# Patient Record
Sex: FEMALE | Race: WHITE | NOT HISPANIC OR LATINO | URBAN - METROPOLITAN AREA
[De-identification: names, ages, dates, MRNs, and addresses within clinical notes are randomized per-mention and may not be internally consistent; named-entity substitution may affect disease eponyms.]

---

## 2017-02-13 ENCOUNTER — COMMUNICATION - HEALTHEAST (OUTPATIENT)
Dept: FAMILY MEDICINE | Facility: CLINIC | Age: 57
End: 2017-02-13

## 2017-02-13 DIAGNOSIS — E03.9 HYPOTHYROID: ICD-10-CM

## 2017-02-18 ENCOUNTER — COMMUNICATION - HEALTHEAST (OUTPATIENT)
Dept: FAMILY MEDICINE | Facility: CLINIC | Age: 57
End: 2017-02-18

## 2017-02-27 ENCOUNTER — OFFICE VISIT - HEALTHEAST (OUTPATIENT)
Dept: FAMILY MEDICINE | Facility: CLINIC | Age: 57
End: 2017-02-27

## 2017-02-27 DIAGNOSIS — F41.1 GAD (GENERALIZED ANXIETY DISORDER): ICD-10-CM

## 2017-02-27 ASSESSMENT — MIFFLIN-ST. JEOR: SCORE: 1101.59

## 2017-03-16 ENCOUNTER — COMMUNICATION - HEALTHEAST (OUTPATIENT)
Dept: FAMILY MEDICINE | Facility: CLINIC | Age: 57
End: 2017-03-16

## 2017-05-01 ENCOUNTER — AMBULATORY - HEALTHEAST (OUTPATIENT)
Dept: LAB | Facility: CLINIC | Age: 57
End: 2017-05-01

## 2017-05-01 DIAGNOSIS — E56.9 VITAMIN DEFICIENCY: ICD-10-CM

## 2017-05-01 DIAGNOSIS — E78.5 HYPERLIPIDEMIA: ICD-10-CM

## 2017-05-01 LAB
CHOLEST SERPL-MCNC: 193 MG/DL
FASTING STATUS PATIENT QL REPORTED: YES
HDLC SERPL-MCNC: 55 MG/DL
LDLC SERPL CALC-MCNC: 123 MG/DL
TRIGL SERPL-MCNC: 76 MG/DL

## 2017-06-22 ENCOUNTER — OFFICE VISIT - HEALTHEAST (OUTPATIENT)
Dept: FAMILY MEDICINE | Facility: CLINIC | Age: 57
End: 2017-06-22

## 2017-06-22 ENCOUNTER — COMMUNICATION - HEALTHEAST (OUTPATIENT)
Dept: SCHEDULING | Facility: CLINIC | Age: 57
End: 2017-06-22

## 2017-06-22 DIAGNOSIS — S16.1XXA NECK STRAIN, INITIAL ENCOUNTER: ICD-10-CM

## 2017-06-22 ASSESSMENT — MIFFLIN-ST. JEOR: SCORE: 1101.59

## 2017-07-13 ENCOUNTER — COMMUNICATION - HEALTHEAST (OUTPATIENT)
Dept: FAMILY MEDICINE | Facility: CLINIC | Age: 57
End: 2017-07-13

## 2017-07-13 RX ORDER — MESALAMINE 1000 MG/1
SUPPOSITORY RECTAL
Qty: 30 SUPPOSITORY | Refills: 3 | Status: SHIPPED | OUTPATIENT
Start: 2017-07-13

## 2017-08-07 ENCOUNTER — RECORDS - HEALTHEAST (OUTPATIENT)
Dept: ADMINISTRATIVE | Facility: OTHER | Age: 57
End: 2017-08-07

## 2017-08-16 ENCOUNTER — COMMUNICATION - HEALTHEAST (OUTPATIENT)
Dept: FAMILY MEDICINE | Facility: CLINIC | Age: 57
End: 2017-08-16

## 2017-08-16 DIAGNOSIS — F41.1 GAD (GENERALIZED ANXIETY DISORDER): ICD-10-CM

## 2017-09-11 ENCOUNTER — RECORDS - HEALTHEAST (OUTPATIENT)
Dept: ADMINISTRATIVE | Facility: OTHER | Age: 57
End: 2017-09-11

## 2017-11-15 ENCOUNTER — RECORDS - HEALTHEAST (OUTPATIENT)
Dept: ADMINISTRATIVE | Facility: OTHER | Age: 57
End: 2017-11-15

## 2017-11-22 ENCOUNTER — OFFICE VISIT - HEALTHEAST (OUTPATIENT)
Dept: FAMILY MEDICINE | Facility: CLINIC | Age: 57
End: 2017-11-22

## 2017-11-22 ENCOUNTER — COMMUNICATION - HEALTHEAST (OUTPATIENT)
Dept: FAMILY MEDICINE | Facility: CLINIC | Age: 57
End: 2017-11-22

## 2017-11-22 DIAGNOSIS — Z12.31 VISIT FOR SCREENING MAMMOGRAM: ICD-10-CM

## 2017-11-22 DIAGNOSIS — Z00.00 ROUTINE GENERAL MEDICAL EXAMINATION AT A HEALTH CARE FACILITY: ICD-10-CM

## 2017-11-22 LAB
CHOLEST SERPL-MCNC: 236 MG/DL
FASTING STATUS PATIENT QL REPORTED: YES
HDLC SERPL-MCNC: 59 MG/DL
LDLC SERPL CALC-MCNC: 158 MG/DL
TRIGL SERPL-MCNC: 97 MG/DL

## 2017-11-22 ASSESSMENT — MIFFLIN-ST. JEOR: SCORE: 1092.52

## 2017-11-27 ENCOUNTER — AMBULATORY - HEALTHEAST (OUTPATIENT)
Dept: FAMILY MEDICINE | Facility: CLINIC | Age: 57
End: 2017-11-27

## 2017-12-21 ENCOUNTER — RECORDS - HEALTHEAST (OUTPATIENT)
Dept: ADMINISTRATIVE | Facility: OTHER | Age: 57
End: 2017-12-21

## 2018-01-18 ENCOUNTER — COMMUNICATION - HEALTHEAST (OUTPATIENT)
Dept: FAMILY MEDICINE | Facility: CLINIC | Age: 58
End: 2018-01-18

## 2018-01-18 DIAGNOSIS — F41.1 GAD (GENERALIZED ANXIETY DISORDER): ICD-10-CM

## 2018-02-20 ENCOUNTER — COMMUNICATION - HEALTHEAST (OUTPATIENT)
Dept: FAMILY MEDICINE | Facility: CLINIC | Age: 58
End: 2018-02-20

## 2018-02-20 DIAGNOSIS — E03.9 HYPOTHYROID: ICD-10-CM

## 2018-05-22 ENCOUNTER — COMMUNICATION - HEALTHEAST (OUTPATIENT)
Dept: LAB | Facility: CLINIC | Age: 58
End: 2018-05-22

## 2018-05-22 ENCOUNTER — AMBULATORY - HEALTHEAST (OUTPATIENT)
Dept: FAMILY MEDICINE | Facility: CLINIC | Age: 58
End: 2018-05-22

## 2018-05-22 DIAGNOSIS — E78.5 HYPERLIPIDEMIA: ICD-10-CM

## 2018-05-23 ENCOUNTER — AMBULATORY - HEALTHEAST (OUTPATIENT)
Dept: LAB | Facility: CLINIC | Age: 58
End: 2018-05-23

## 2018-05-23 DIAGNOSIS — E78.5 HYPERLIPIDEMIA: ICD-10-CM

## 2018-05-23 LAB
CHOLEST SERPL-MCNC: 200 MG/DL
FASTING STATUS PATIENT QL REPORTED: YES
HDLC SERPL-MCNC: 60 MG/DL
LDLC SERPL CALC-MCNC: 130 MG/DL
TRIGL SERPL-MCNC: 49 MG/DL

## 2018-08-21 ENCOUNTER — COMMUNICATION - HEALTHEAST (OUTPATIENT)
Dept: FAMILY MEDICINE | Facility: CLINIC | Age: 58
End: 2018-08-21

## 2018-08-21 DIAGNOSIS — E03.9 HYPOTHYROID: ICD-10-CM

## 2018-08-21 DIAGNOSIS — F41.1 GAD (GENERALIZED ANXIETY DISORDER): ICD-10-CM

## 2018-10-29 ENCOUNTER — COMMUNICATION - HEALTHEAST (OUTPATIENT)
Dept: FAMILY MEDICINE | Facility: CLINIC | Age: 58
End: 2018-10-29

## 2018-10-29 ENCOUNTER — OFFICE VISIT - HEALTHEAST (OUTPATIENT)
Dept: FAMILY MEDICINE | Facility: CLINIC | Age: 58
End: 2018-10-29

## 2018-10-29 DIAGNOSIS — Z00.00 ROUTINE GENERAL MEDICAL EXAMINATION AT A HEALTH CARE FACILITY: ICD-10-CM

## 2018-10-29 DIAGNOSIS — Z23 NEED FOR VACCINATION: ICD-10-CM

## 2018-10-29 DIAGNOSIS — E78.5 HYPERLIPIDEMIA: ICD-10-CM

## 2018-10-29 DIAGNOSIS — E03.9 HYPOTHYROID: ICD-10-CM

## 2018-10-29 LAB
ANION GAP SERPL CALCULATED.3IONS-SCNC: 12 MMOL/L (ref 5–18)
BUN SERPL-MCNC: 7 MG/DL (ref 8–22)
CALCIUM SERPL-MCNC: 10 MG/DL (ref 8.5–10.5)
CHLORIDE BLD-SCNC: 102 MMOL/L (ref 98–107)
CHOLEST SERPL-MCNC: 222 MG/DL
CO2 SERPL-SCNC: 23 MMOL/L (ref 22–31)
CREAT SERPL-MCNC: 0.7 MG/DL (ref 0.6–1.1)
FASTING STATUS PATIENT QL REPORTED: YES
GFR SERPL CREATININE-BSD FRML MDRD: >60 ML/MIN/1.73M2
GLUCOSE BLD-MCNC: 101 MG/DL (ref 70–125)
HDLC SERPL-MCNC: 72 MG/DL
LDLC SERPL CALC-MCNC: 137 MG/DL
POTASSIUM BLD-SCNC: 4.4 MMOL/L (ref 3.5–5)
SODIUM SERPL-SCNC: 137 MMOL/L (ref 136–145)
TRIGL SERPL-MCNC: 63 MG/DL
TSH SERPL DL<=0.005 MIU/L-ACNC: 1.6 UIU/ML (ref 0.3–5)

## 2018-10-29 ASSESSMENT — MIFFLIN-ST. JEOR: SCORE: 1076.92

## 2018-11-21 ENCOUNTER — RECORDS - HEALTHEAST (OUTPATIENT)
Dept: ADMINISTRATIVE | Facility: OTHER | Age: 58
End: 2018-11-21

## 2019-09-07 ENCOUNTER — COMMUNICATION - HEALTHEAST (OUTPATIENT)
Dept: FAMILY MEDICINE | Facility: CLINIC | Age: 59
End: 2019-09-07

## 2019-09-07 DIAGNOSIS — F41.1 GAD (GENERALIZED ANXIETY DISORDER): ICD-10-CM

## 2019-09-07 DIAGNOSIS — E03.9 HYPOTHYROID: ICD-10-CM

## 2019-11-22 ENCOUNTER — RECORDS - HEALTHEAST (OUTPATIENT)
Dept: ADMINISTRATIVE | Facility: OTHER | Age: 59
End: 2019-11-22

## 2019-11-25 ENCOUNTER — OFFICE VISIT - HEALTHEAST (OUTPATIENT)
Dept: FAMILY MEDICINE | Facility: CLINIC | Age: 59
End: 2019-11-25

## 2019-11-25 DIAGNOSIS — Z00.00 ROUTINE GENERAL MEDICAL EXAMINATION AT A HEALTH CARE FACILITY: ICD-10-CM

## 2019-11-25 DIAGNOSIS — K62.89 PROCTITIS: ICD-10-CM

## 2019-11-25 DIAGNOSIS — Z23 FLU VACCINE NEED: ICD-10-CM

## 2019-11-25 DIAGNOSIS — Z12.4 PAP SMEAR FOR CERVICAL CANCER SCREENING: ICD-10-CM

## 2019-11-25 ASSESSMENT — MIFFLIN-ST. JEOR: SCORE: 1115.2

## 2019-11-26 LAB
HPV SOURCE: NORMAL
HUMAN PAPILLOMA VIRUS 16 DNA: NEGATIVE
HUMAN PAPILLOMA VIRUS 18 DNA: NEGATIVE
HUMAN PAPILLOMA VIRUS FINAL DIAGNOSIS: NORMAL
HUMAN PAPILLOMA VIRUS OTHER HR: NEGATIVE
SPECIMEN DESCRIPTION: NORMAL

## 2019-12-04 LAB
BKR LAB AP ABNORMAL BLEEDING: NO
BKR LAB AP BIRTH CONTROL/HORMONES: NORMAL
BKR LAB AP CERVICAL APPEARANCE: NORMAL
BKR LAB AP GYN ADEQUACY: NORMAL
BKR LAB AP GYN INTERPRETATION: NORMAL
BKR LAB AP HPV REFLEX: NORMAL
BKR LAB AP LMP: NORMAL
BKR LAB AP PATIENT STATUS: NORMAL
BKR LAB AP PREVIOUS ABNORMAL: NORMAL
BKR LAB AP PREVIOUS NORMAL: 2016
HIGH RISK?: NO
PATH REPORT.COMMENTS IMP SPEC: NORMAL
RESULT FLAG (HE HISTORICAL CONVERSION): NORMAL

## 2019-12-06 ENCOUNTER — COMMUNICATION - HEALTHEAST (OUTPATIENT)
Dept: FAMILY MEDICINE | Facility: CLINIC | Age: 59
End: 2019-12-06

## 2019-12-06 DIAGNOSIS — F41.1 GAD (GENERALIZED ANXIETY DISORDER): ICD-10-CM

## 2019-12-06 DIAGNOSIS — E03.9 HYPOTHYROID: ICD-10-CM

## 2019-12-07 RX ORDER — FLUOXETINE 10 MG/1
CAPSULE ORAL
Qty: 90 CAPSULE | Refills: 3 | Status: SHIPPED | OUTPATIENT
Start: 2019-12-07

## 2019-12-09 RX ORDER — LEVOTHYROXINE SODIUM 50 UG/1
TABLET ORAL
Qty: 90 TABLET | Refills: 3 | Status: SHIPPED | OUTPATIENT
Start: 2019-12-09

## 2020-01-02 ENCOUNTER — RECORDS - HEALTHEAST (OUTPATIENT)
Dept: ADMINISTRATIVE | Facility: OTHER | Age: 60
End: 2020-01-02

## 2020-10-28 ENCOUNTER — RECORDS - HEALTHEAST (OUTPATIENT)
Dept: ADMINISTRATIVE | Facility: OTHER | Age: 60
End: 2020-10-28

## 2021-03-23 ENCOUNTER — RECORDS - HEALTHEAST (OUTPATIENT)
Dept: ADMINISTRATIVE | Facility: OTHER | Age: 61
End: 2021-03-23

## 2021-05-30 VITALS — WEIGHT: 117 LBS | HEIGHT: 65 IN | BODY MASS INDEX: 19.49 KG/M2

## 2021-05-31 ENCOUNTER — RECORDS - HEALTHEAST (OUTPATIENT)
Dept: ADMINISTRATIVE | Facility: CLINIC | Age: 61
End: 2021-05-31

## 2021-05-31 VITALS — HEIGHT: 65 IN | WEIGHT: 117 LBS | BODY MASS INDEX: 19.49 KG/M2

## 2021-05-31 VITALS — WEIGHT: 115 LBS | HEIGHT: 65 IN | BODY MASS INDEX: 19.16 KG/M2

## 2021-06-01 NOTE — TELEPHONE ENCOUNTER
PCP to review    Refill Approved x 2    Rx renewed per Medication Renewal Policy. Medication was last renewed on 8/22/18.    Maritza Huggins, [facility] Triage/Med Refill 9/8/2019     Requested Prescriptions   Pending Prescriptions Disp Refills     FLUoxetine (PROZAC) 10 MG capsule [Pharmacy Med Name: FLUOXETINE 10MG CAPSULES] 90 capsule 0     Sig: TAKE 1 CAPSULE BY MOUTH ONCE DAILY       SSRI Refill Protocol  Passed - 9/7/2019  5:10 PM        Passed - PCP or prescribing provider visit in last year     Last office visit with prescriber/PCP: 10/29/2018 Dorothy Brock MD OR same dept: 10/29/2018 Dorothy Brock MD OR same specialty: 10/29/2018 Dorothy Brock MD  Last physical: 11/22/2017 Last MTM visit: Visit date not found   Next visit within 3 mo: Visit date not found  Next physical within 3 mo: Visit date not found  Prescriber OR PCP: Dorothy Brcok MD  Last diagnosis associated with med order: 1. MITCH (generalized anxiety disorder)  - FLUoxetine (PROZAC) 10 MG capsule [Pharmacy Med Name: FLUOXETINE 10MG CAPSULES]; TAKE 1 CAPSULE BY MOUTH ONCE DAILY  Dispense: 90 capsule; Refill: 0    2. Hypothyroid  - levothyroxine (SYNTHROID, LEVOTHROID) 50 MCG tablet [Pharmacy Med Name: LEVOTHYROXINE 0.05MG (50MCG) TAB]; TAKE 1 TABLET(50 MCG TOTAL) BY MOUTH DAILY AT 6:00 AM.  Dispense: 90 tablet; Refill: 0    If protocol passes may refill for 12 months if within 3 months of last provider visit (or a total of 15 months).             levothyroxine (SYNTHROID, LEVOTHROID) 50 MCG tablet [Pharmacy Med Name: LEVOTHYROXINE 0.05MG (50MCG) TAB] 90 tablet 0     Sig: TAKE 1 TABLET(50 MCG TOTAL) BY MOUTH DAILY AT 6:00 AM.       Thyroid Hormones Protocol Passed - 9/7/2019  5:10 PM        Passed - Provider visit in past 12 months or next 3 months     Last office visit with prescriber/PCP: 10/29/2018 Dorothy Brock MD OR same dept: 10/29/2018 Dorothy Brock MD OR same specialty: 10/29/2018 Dorothy Brock MD  Last  physical: 11/22/2017 Last MTM visit: Visit date not found   Next visit within 3 mo: Visit date not found  Next physical within 3 mo: Visit date not found  Prescriber OR PCP: Dorothy Brock MD  Last diagnosis associated with med order: 1. MITCH (generalized anxiety disorder)  - FLUoxetine (PROZAC) 10 MG capsule [Pharmacy Med Name: FLUOXETINE 10MG CAPSULES]; TAKE 1 CAPSULE BY MOUTH ONCE DAILY  Dispense: 90 capsule; Refill: 0    2. Hypothyroid  - levothyroxine (SYNTHROID, LEVOTHROID) 50 MCG tablet [Pharmacy Med Name: LEVOTHYROXINE 0.05MG (50MCG) TAB]; TAKE 1 TABLET(50 MCG TOTAL) BY MOUTH DAILY AT 6:00 AM.  Dispense: 90 tablet; Refill: 0    If protocol passes may refill for 12 months if within 3 months of last provider visit (or a total of 15 months).             Passed - TSH on file in past 12 months for patient age 12 & older     TSH   Date Value Ref Range Status   10/29/2018 1.60 0.30 - 5.00 uIU/mL Final

## 2021-06-02 VITALS — WEIGHT: 111.56 LBS | HEIGHT: 65 IN | BODY MASS INDEX: 18.59 KG/M2

## 2021-06-03 VITALS
HEART RATE: 76 BPM | TEMPERATURE: 98 F | BODY MASS INDEX: 19.99 KG/M2 | SYSTOLIC BLOOD PRESSURE: 100 MMHG | WEIGHT: 120 LBS | DIASTOLIC BLOOD PRESSURE: 60 MMHG | HEIGHT: 65 IN

## 2021-06-03 NOTE — PROGRESS NOTES
Chloe Pereira is a 59 y.o. female is here for a  Health Maintenance exam. Patient is overall doing well.  Patient travels for work.  She has no concerns today.  Has been seen at [facility] and diagnosed with a rectal proctitis which is very small but does require treatment.    Healthy Habits:   Regular Exercise: Yes  Sunscreen Use: Yes  Healthy Diet: Yes  Dental Visits Regularly: Yes  Seat Belt: Yes  Sexually active: Yes  Self Breast Exam Monthly:Yes  Hemoccults: No  Flex Sig: No  Colonoscopy: Yes  Lipid Profile: Yes  Glucose Screen: Yes  Prevention of Osteoporosis: Yes  Last Dexa: No  Guns at Home:  No  Domestic Violence:  No    Current Outpatient Medications Include:    Current Outpatient Medications:      FLUoxetine (PROZAC) 10 MG capsule, TAKE 1 CAPSULE BY MOUTH ONCE DAILY, Disp: 90 capsule, Rfl: 0     levothyroxine (SYNTHROID, LEVOTHROID) 50 MCG tablet, TAKE 1 TABLET(50 MCG TOTAL) BY MOUTH DAILY AT 6:00 AM., Disp: 90 tablet, Rfl: 0     mesalamine (CANASA) 1000 MG suppository, INSERT 1 SUPPOSITORY BY RECTAL ROUTE EVERY DAY AT BEDTIME, Disp: 30 suppository, Rfl: 3     cholecalciferol, vitamin D3, 4,000 unit cap, Take 1 capsule by mouth daily., Disp: , Rfl: 0     LORazepam (ATIVAN) 0.5 MG tablet, TAKE 1 TABLET (0.5 MG) BY MOUTH EVERY 6 HOURS AS NEEDED FOR ANXIETY., Disp: 10 tablet, Rfl: 0    Allergies:  No Known Allergies    No past medical history on file.    No past surgical history on file.    OB History    Para Term  AB Living   3 3 3     6   SAB TAB Ectopic Multiple Live Births           3      # Outcome Date GA Lbr Anuel/2nd Weight Sex Delivery Anes PTL Lv   3 Term 97    M Vag-Spont  N GABRIELLA   2 Term 90    F Vag-Spont  N GABRIELLA   1 Term 03/10/88    M Vag-Spont  N GABRIELLA       Immunization History   Administered Date(s) Administered     INFLUENZA,RECOMBINANT,INJ,PF QUADRIVALENT 18+YRS 10/29/2018     Influenza, inj, historic,unspecified 10/27/2016     Influenza,seasonal quad, PF, =/>  6months 10/11/2014, 10/15/2015, 10/19/2016, 11/20/2017     MMR 03/04/2015     PPD Test 02/11/2010, 03/04/2015, 03/03/2017     Tdap 08/11/2011       Family History   Problem Relation Age of Onset     Skin cancer Mother         Squamous  A&W at 80 on 2017     Hypothyroidism Mother      Skin cancer Father         Squamous     Hypothyroidism Father      Atrial fibrillation Father      Hypothyroidism Sister      No Medical Problems Brother      No Medical Problems Sister        Social History     Socioeconomic History     Marital status:      Spouse name: Iglesia     Number of children: 3     Years of education: Not on file     Highest education level: Not on file   Occupational History     Occupation: RN     Comment: Travels, 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   Social Needs     Financial resource strain: Not on file     Food insecurity:     Worry: Not on file     Inability: Not on file     Transportation needs:     Medical: Not on file     Non-medical: Not on file   Tobacco Use     Smoking status: Former Smoker     Smokeless tobacco: Never Used   Substance and Sexual Activity     Alcohol use: Yes     Drug use: No     Sexual activity: Yes   Lifestyle     Physical activity:     Days per week: Not on file     Minutes per session: Not on file     Stress: Not on file   Relationships     Social connections:     Talks on phone: Not on file     Gets together: Not on file     Attends [employer] service: Not on file     Active member of club or organization: Not on file     Attends meetings of clubs or organizations: Not on file     Relationship status: Not on file     Intimate partner violence:     Fear of current or ex partner: Not on file     Emotionally abused: Not on file     Physically abused: Not on file     Forced sexual activity: Not on file   Other Topics Concern     Not on file   Social History Narrative     Not on file       Last cholesterol:   Lab Results   Component Value Date    CHOL 222 (H) 10/29/2018    CHOL 200 (H) 05/23/2018     CHOL 236 (H) 11/22/2017     Lab Results   Component Value Date    HDL 72 10/29/2018    HDL 60 05/23/2018    HDL 59 11/22/2017     Lab Results   Component Value Date    LDLCALC 137 (H) 10/29/2018    LDLCALC 130 (H) 05/23/2018    LDLCALC 158 (H) 11/22/2017     Lab Results   Component Value Date    TRIG 63 10/29/2018    TRIG 49 05/23/2018    TRIG 97 11/22/2017     No components found for: CHOLHDL    Mammogram 12/21/2017  Colonoscopy 12/14/2012 to be repeated in 10 years.  Pap smear is due today.    Birth Control Method: Not applicable  High Risk/Behavior: None      LMP: No LMP recorded. Patient is postmenopausal.        Review of Systems:   General:  Denies fever, chills, HA, fatigue, myalgias, weight change    Eyes: Denies vision changes   Ears/Nose/Throat: Denies nasal congestion, rhinorrhea, ear pain or discharge, sore throat, swollen glands  Cardiovascular: Denies CP, palpitations  Respiratory:  Denies SOB, cough  Gastrointestinal:  Denies changes in bowel habits, melena, rectal bleeding,  Genitourinary: Denies changes in urine habits/frequency/dysuria, hematuria   Musculoskeletal:  Denies  joint pain or swelling or erythema, edema  Skin: Denies rashes   Neurologic: Denies weakness, paresthesia  Psychiatric: Denies mood changes   Endocrine: Denies polyuria, polydipsia, polyphagia  Heme/Lymphatic: Denies problem with bleeding   Allergic/Immunologic: Denies problem     POSITIVES: 112 point review of systems is negative      PHYSICAL EXAM:    There were no vitals taken for this visit.    Wt Readings from Last 3 Encounters:   10/29/18 111 lb 9 oz (50.6 kg)   11/22/17 115 lb (52.2 kg)   06/22/17 117 lb (53.1 kg)       Body mass index is 19.97 kg/m .    Well developed, well nourished, no acute distress.  HEENT: normocephalic/atraumatic, PERRLA/EOMI, TMs: Gray, normal light reflex, no nasal discharge.  Oral mucosa: no erythema/exudate  Neck: No LAD/masses/thyromegaly/bruits  Lungs: clear bilaterally  Heart: regular rate  and rhythm, no murmurs/gallops/rubs  Breasts: symmetric, no masses/skin changes, nipple discharge, or axillary LAD.  BSE reviewed.  Abdomen: Normal bowel sounds, soft, non-tender, non-distended, no masses, neg Garcia's/McBurney's, no rebound/guarding  Genital: Normal external genitalia, no discharge, no lesions, cervix is non-friable, os is closed, no CMT, no adnexal tenderness or fullness.  Uterus is not enlarged, perineum intact.  Thin prep done.    Rectal: internal exam is deferred.  External exam is normal.  Lymphatics: no supraclavicular/axillary/epitrochlear/inguinal LAD. No edema.  Neuro: A&O x 3, CN II-XII intact, strength 5/5, reflexes symmetric, sensory intact to light touch.  Psych: Behavior appropriate, engaging.  Thought processes congruent, non-tangential.  Musculoskeletal: Extremities normal, atraumatic, no swelling  Skin: no rashes or lesions.        ASSESSMENT/PLAN: 59 y.o. female physical exam and pap smear.  The 10-year ASCVD risk score (Darline MAXWELL Jr., et al., 2013) is: 1.5%    Values used to calculate the score:      Age: 59 years      Sex: Female      Is Non- : No      Diabetic: No      Tobacco smoker: No      Systolic Blood Pressure: 100 mmHg      Is BP treated: No      HDL Cholesterol: 72 mg/dL      Total Cholesterol: 222 mg/dL          1. Routine general medical examination at a health care facility  Normal exam    2. Flu vaccine need  Flu shot is administered  - Influenza, Recombinant, Inj, Quadrivalent, PF, 18+YRS    3. Pap smear for cervical cancer screening  Will inform patient of results when available  - Gynecologic Cytology (PAP Smear)  - HPV High Risk DNA Cervical    4. Proctitis  [facility]      There are no discontinued medications.    Routine health maintenance discussion:  No smoking, limited alcohol (7 or less servings per week), 5 fruits/veg servings per day, 200 minutes of exercise per week.  Daily calcium/vitamin D guidelines, bone health,  yearly mammogram after age 39/regular pap smears/colon cancer screening beginning at age 50.  Accident avoidance, sun screen.      Will contact her with the results of the labs when available.    Dorothy Brock M.D.

## 2021-06-04 NOTE — TELEPHONE ENCOUNTER
RN cannot approve Refill Request: levothyroxine (SYNTHROID, LEVOTHROID) 50 MCG tablet    RN can NOT refill this medication PCP messaged that patient is overdue for Labs. Last office visit: 10/29/2018 Dorothy Brock MD Last Physical: 11/25/2019 Last MTM visit: Visit date not found Last visit same specialty: 10/29/2018 Dorothy Brock MD.  Next visit within 3 mo: Visit date not found  Next physical within 3 mo: Visit date not found    Refill Approved: FLUoxetine (PROZAC) 10 MG capsule     Rx renewed per Medication Renewal Policy. Medication was last renewed on 9/9/19.    Catherine Cote, [facility] Triage/Med Refill 12/7/2019     Requested Prescriptions   Pending Prescriptions Disp Refills     levothyroxine (SYNTHROID, LEVOTHROID) 50 MCG tablet [Pharmacy Med Name: LEVOTHYROXINE 0.05MG (50MCG) TAB] 90 tablet 0     Sig: TAKE 1 TABLET(50 MCG TOTAL) BY MOUTH DAILY AT 6:00 AM.       Thyroid Hormones Protocol Failed - 12/6/2019 10:04 AM        Failed - TSH on file in past 12 months for patient age 12 & older     TSH   Date Value Ref Range Status   10/29/2018 1.60 0.30 - 5.00 uIU/mL Final                   Passed - Provider visit in past 12 months or next 3 months     Last office visit with prescriber/PCP: 10/29/2018 Dorothy Brock MD OR same dept: Visit date not found OR same specialty: 10/29/2018 Dorothy Brock MD  Last physical: 11/25/2019 Last MTM visit: Visit date not found   Next visit within 3 mo: Visit date not found  Next physical within 3 mo: Visit date not found  Prescriber OR PCP: Dorothy Brock MD  Last diagnosis associated with med order: 1. Hypothyroid  - levothyroxine (SYNTHROID, LEVOTHROID) 50 MCG tablet [Pharmacy Med Name: LEVOTHYROXINE 0.05MG (50MCG) TAB]; TAKE 1 TABLET(50 MCG TOTAL) BY MOUTH DAILY AT 6:00 AM.  Dispense: 90 tablet; Refill: 0    2. MITCH (generalized anxiety disorder)  - FLUoxetine (PROZAC) 10 MG capsule [Pharmacy Med Name: FLUOXETINE 10MG CAPSULES]; TAKE ONE CAPSULE BY MOUTH  DAILY  Dispense: 90 capsule; Refill: 0    If protocol passes may refill for 12 months if within 3 months of last provider visit (or a total of 15 months).             FLUoxetine (PROZAC) 10 MG capsule [Pharmacy Med Name: FLUOXETINE 10MG CAPSULES] 90 capsule 0     Sig: TAKE ONE CAPSULE BY MOUTH DAILY       SSRI Refill Protocol  Passed - 12/6/2019 10:04 AM        Passed - PCP or prescribing provider visit in last year     Last office visit with prescriber/PCP: 10/29/2018 Dorothy Brock MD OR same dept: Visit date not found OR same specialty: 10/29/2018 Dorothy Brock MD  Last physical: 11/25/2019 Last MTM visit: Visit date not found   Next visit within 3 mo: Visit date not found  Next physical within 3 mo: Visit date not found  Prescriber OR PCP: Dorothy Brock MD  Last diagnosis associated with med order: 1. Hypothyroid  - levothyroxine (SYNTHROID, LEVOTHROID) 50 MCG tablet [Pharmacy Med Name: LEVOTHYROXINE 0.05MG (50MCG) TAB]; TAKE 1 TABLET(50 MCG TOTAL) BY MOUTH DAILY AT 6:00 AM.  Dispense: 90 tablet; Refill: 0    2. MITCH (generalized anxiety disorder)  - FLUoxetine (PROZAC) 10 MG capsule [Pharmacy Med Name: FLUOXETINE 10MG CAPSULES]; TAKE ONE CAPSULE BY MOUTH DAILY  Dispense: 90 capsule; Refill: 0    If protocol passes may refill for 12 months if within 3 months of last provider visit (or a total of 15 months).

## 2021-06-09 NOTE — PROGRESS NOTES
ASSESSMENT/PLAN:     1. MITCH (generalized anxiety disorder)  Patient has used this very appropriately and in fact 10 tablets have lasted 2 years.  We talked about how anxiety tends to appear when her defenses are down and this most certainly is the case for her.  - LORazepam (ATIVAN) 0.5 MG tablet; Take 1 tablet (0.5 mg total) by mouth every 6 (six) hours as needed for anxiety.  Dispense: 10 tablet; Refill: 0      I think getting the flu this year with simply bad luck.  It appears that there is been a strain of influenza a resistant to our vaccine.  I continue to recommend yearly influenza vaccination.  Certainly this patient is supportive of that.  I am happy to provide her with a prescription for Tamiflu as a preventative measure for her to use during travel if needed.    CHIEF COMPLAINT  Chief Complaint   Patient presents with     Follow-up     pt was [facility] for [facility],        HPI:  Chloe Pereira is a 56 y.o. female presenting to the clinic today for a follow up regarding an emergency room visit. She visited the [facility] emergency room in [place] on 2/18/17 with influenza-like symptoms. At the emergency room, she had a low grade fever of 99.5-100 and said that her fever began 5 days prior. She also had a dry cough, body aches, chills, and fatigue at that time. She was concerned that her fever had lasted for so long without breaking. She tested positive for influenza A. She traveled on a plane after her onset of symptoms, and returned home the next day. She states that she is still not feeling completely better. She says that she started to feel anxious this past week because she continued to feel weak. She became short of breath and took ativan that she had left over from and emergency room visit for a panic attack in May 2016.     Health Maintenance: She received her seasonal flu shot this year. She continues taking vitamin D, but did not increase her dose as recommended at her physical in the fall.  Since having the flu, she has increased her dose to 5,000 units.        REVIEW OF SYSTEMS:   She has been forcing herself to eat since she has been sick so she does not lose weight. She has been eating healthy and says that she has never felt as healthy as she has in the past four months. All other systems negative.     PSFH:  She travels often for work and enjoys it. Her  recently had an upper respiratory infection, and is unsure if it may have been the flu. She had influenza in 2013 and 2014, both lasting about two weeks. She did not get a flu shot either of these years. She had her flu shot 2015 and 2016, and did not get sick.     Patient Active Problem List   Diagnosis     Vitamin Deficiency     Asymptomatic (Natural) Menopause     Hypothyroid     Colitis     MITCH (generalized anxiety disorder)     Hyperlipidemia       TOBACCO USE:  History   Smoking Status     Former Smoker   Smokeless Tobacco     Not on file     Social History     Social History     Marital status:      Spouse name: N/A     Number of children: N/A     Years of education: N/A     Occupational History     Not on file.     Social History Main Topics     Smoking status: Former Smoker     Smokeless tobacco: Not on file     Alcohol use Yes     Drug use: Not on file     Sexual activity: Not on file     Other Topics Concern     Not on file     Social History Narrative       OBJECTIVE:       Vitals:    02/27/17 0853   BP: 120/70   Pulse: 72     Weight: 117 lb (53.1 kg)  Wt Readings from Last 3 Encounters:   02/27/17 117 lb (53.1 kg)   11/16/16 108 lb (49 kg)   04/28/16 109 lb (49.4 kg)     Body mass index is 19.47 kg/(m^2).      Physical Exam:  GENERAL APPEARANCE: A&A, NAD, well hydrated, well nourished  SKIN:  Normal skin turgor, no lesions/rashes   NECK: Supple, without lymphadenopathy, no thyroid mass  CV: RRR, no M/G/R   LUNGS: CTAB, normal respiratory effort  PSYCHIATRIC;  Mood appropriate, memory intact      Dorothy Brock,  MD    ADDITIONAL HISTORY SUMMARIZED (2): Reviewed ER note from 2/18/17 regarding influenza.  DECISION TO OBTAIN EXTRA INFORMATION (1): Accessed Care Everywhere.   RADIOLOGY TESTS (1): None.  LABS (1): Reviewed labs from 2/18/17.   MEDICINE TESTS (1): None.  INDEPENDENT REVIEW (2 each): None.     The visit lasted a total of 18 minutes face to face with the patient. Over 50% of the time was spent counseling and educating the patient about a follow up.    IAntonia, am scribing for and in the presence of, Dr. Brock.    I, Dr. Brock, personally performed the services described in this documentation, as scribed by Antonia Ochoa in my presence, and it is both accurate and complete.       MEDICATIONS   Current Outpatient Prescriptions   Medication Sig Dispense Refill     cholecalciferol, vitamin D3, 4,000 unit cap Take 1 capsule by mouth daily.  0     FLUoxetine (PROZAC) 10 MG capsule Take 1 capsule (10 mg total) by mouth daily. 90 capsule 3     levothyroxine (SYNTHROID, LEVOTHROID) 50 MCG tablet TAKE 1 TABLET (50 MCG TOTAL) BY MOUTH DAILY AT 6:00 AM. 90 tablet 3     levothyroxine (SYNTHROID, LEVOTHROID) 50 MCG tablet Take 50 mcg by mouth.       LORazepam (ATIVAN) 0.5 MG tablet Take 1 tablet (0.5 mg total) by mouth every 6 (six) hours as needed for anxiety. 10 tablet 0     mesalamine (CANASA) 1000 MG suppository INSERT 1 SUPPOSITORY BY RECTAL ROUTE EVERY DAY AT BEDTIME 30 suppository 3     oseltamivir (TAMIFLU) 75 MG capsule Take 1 capsule (75 mg total) by mouth daily for 10 days. 10 capsule 0     No current facility-administered medications for this visit.          Total data points: 4

## 2021-06-11 NOTE — PROGRESS NOTES
1. Neck strain, initial encounter          Plan: I suggested that this is very likely muscular in nature.  Her numbness could just be some muscular spasm and swelling causing some peripheral nerve issues.  I told her that this should be transient and resolve over the next week or so.  I suggested using ibuprofen 600 mg 3 times daily as needed heat to the area as well.  Gentle stretching exercises may be of benefit too.  If she is not improving in a couple of weeks, she will return, and possibly we could consider doing an MRI or some other imaging of her neck as well.    Subjective: 57-year-old female who is here today with neck and shoulder pain on the right side for about 6 days.  She was out traveling in [place] with her children, and she was doing a lot of lifting of suitcases and baggage.  She started noticing her shoulder and neck bothering her at that point.  She states that it is continued to be present, and however that the pain has not been getting worse, but she has been having more numbness going down her arm into the thumb on the right side in the last few days.  She does not think there is been any weakness of her  strength.  She does not think there is been any range of motion issues, but she has had trouble sleeping laying on her right side.    Objective: Well-appearing female in no acute distress.  Vital signs as noted.  Ears are clear bilaterally eyes and nose are normal.  Oropharynx is clear.  Neck shows some tenderness to the paraspinal musculature on the right side.  She has normal strength and sensation in all 4 extremities, she may have some slight decrease the biceps tendon reflex on the right side.  Range of motion is good.

## 2021-06-14 NOTE — PROGRESS NOTES
Chloe Pereira is a 57 y.o. female is here for a  Health Maintenance exam. Patient is overall doing well.  She very much enjoys her work and has no medical concerns.  Her mother is 80 and her  Iglesia is 60.  She will be a grandmother in May, so life is good.    Healthy Habits:   Regular Exercise: Yes  Sunscreen Use: Yes  Healthy Diet: Yes  Dental Visits Regularly: Yes  Seat Belt: Yes  Sexually active: Yes  Self Breast Exam Monthly:No [facility]   Hemoccults: No  Flex Sig: No  Colonoscopy: Yes  Lipid Profile: Yes  Glucose Screen: Yes  Prevention of Osteoporosis: Yes  Last Dexa: No  Guns at Home:  No  Domestic Violence:  No    Current Outpatient Medications Include:    Current Outpatient Prescriptions:      FLUoxetine (PROZAC) 10 MG capsule, TAKE 1 CAPSULE BY MOUTH ONCE DAILY, Disp: 90 capsule, Rfl: 3     levothyroxine (SYNTHROID, LEVOTHROID) 50 MCG tablet, TAKE 1 TABLET (50 MCG TOTAL) BY MOUTH DAILY AT 6:00 AM., Disp: 90 tablet, Rfl: 3     LORazepam (ATIVAN) 0.5 MG tablet, Take 1 tablet (0.5 mg total) by mouth every 6 (six) hours as needed for anxiety., Disp: 10 tablet, Rfl: 0     mesalamine (CANASA) 1000 MG suppository, INSERT 1 SUPPOSITORY BY RECTAL ROUTE EVERY DAY AT BEDTIME, Disp: 30 suppository, Rfl: 3     cholecalciferol, vitamin D3, 4,000 unit cap, Take 1 capsule by mouth daily., Disp: , Rfl: 0     levothyroxine (SYNTHROID, LEVOTHROID) 50 MCG tablet, Take 50 mcg by mouth., Disp: , Rfl:     Allergies:  No Known Allergies    No past medical history on file.    No past surgical history on file.    OB History    Para Term  AB Living   3 3 3   6   SAB TAB Ectopic Multiple Live Births       3      # Outcome Date GA Lbr Anuel/2nd Weight Sex Delivery Anes PTL Lv   3 Term 97    M Vag-Spont  N GABRIELLA   2 Term 90    F Vag-Spont  N GABRIELLA   1 Term 03/10/88    M Vag-Spont  N GABRIELLA          Immunization History   Administered Date(s) Administered     Influenza, inj, historic,unspecified 10/27/2016      Influenza,seasonal quad, PF, 36+MOS 10/11/2014, 10/15/2015, 10/19/2016, 11/20/2017     MMR 03/04/2015     PPD Test 02/11/2010, 03/04/2015, 03/03/2017     Tdap 08/11/2011       Family History   Problem Relation Age of Onset     Skin cancer Mother      Squamous  A&W at 80 on 2017     Hypothyroidism Mother      Skin cancer Father      Squamous     Hypothyroidism Father      Atrial fibrillation Father      Hypothyroidism Sister      No Medical Problems Brother      No Medical Problems Sister        Social History     Social History     Marital status:      Spouse name: Iglesia     Number of children: 3     Years of education: N/A     Occupational History     RN      Travels, 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     Social History Main Topics     Smoking status: Former Smoker     Smokeless tobacco: Never Used     Alcohol use Yes     Drug use: No     Sexual activity: Yes     Other Topics Concern     Not on file     Social History Narrative       Last cholesterol:   Lab Results   Component Value Date    CHOL 236 (H) 11/22/2017    CHOL 193 05/01/2017    CHOL 242 (H) 11/16/2016     Lab Results   Component Value Date    HDL 59 11/22/2017    HDL 55 05/01/2017    HDL 72 11/16/2016     Lab Results   Component Value Date    LDLCALC 158 (H) 11/22/2017    LDLCALC 123 05/01/2017    LDLCALC 157 (H) 11/16/2016     Lab Results   Component Value Date    TRIG 97 11/22/2017    TRIG 76 05/01/2017    TRIG 63 11/16/2016     No components found for: CHOLHDL    Mammogram 11/12/2015, colonoscopy 2/8/2008  Care Team            Dorothy Brock MD PCP - General    [phone]     Soniya Peguero MD Physician, Gastroenterology    [phone]     Jama Stewart MD Dermatology    [phone]         Patient sees Dr. Peguero on a yearly basis and has proven that her rectal colitis is well controlled on mesalamine suppository every other day.        LMP: No LMP recorded. Patient is postmenopausal.      Review of Systems:   General:  Denies fever, chills, HA,  "fatigue, myalgias, weight change    Eyes: Denies vision changes   Ears/Nose/Throat: Denies nasal congestion, rhinorrhea, ear pain or discharge, sore throat, swollen glands  Cardiovascular: Denies CP, palpitations  Respiratory:  Denies SOB, cough  Gastrointestinal:  Denies changes in bowel habits, melena, rectal bleeding,  Genitourinary: Denies changes in urine habits/frequency/dysuria, hematuria   Musculoskeletal:  Denies  joint pain or swelling or erythema, edema  Skin: Denies rashes   Neurologic: Denies weakness, paresthesia  Psychiatric: Denies mood changes   Endocrine: Denies polyuria, polydipsia, polyphagia  Heme/Lymphatic: Denies problem with bleeding   Allergic/Immunologic: Denies problem     POSITIVES:none      PHYSICAL EXAM:    /70  Pulse 78  Temp 98  F (36.7  C)  Ht 5' 5\" (1.651 m)  Wt 115 lb (52.2 kg)  BMI 19.14 kg/m2    Wt Readings from Last 3 Encounters:   11/22/17 115 lb (52.2 kg)   06/22/17 117 lb (53.1 kg)   02/27/17 117 lb (53.1 kg)       Body mass index is 19.14 kg/(m^2).    Well developed, well nourished, no acute distress.  HEENT: normocephalic/atraumatic, PERRLA/EOMI, TMs: Gray, normal light reflex, no nasal discharge.  Oral mucosa: no erythema/exudate  Neck: No LAD/masses/thyromegaly/bruits  Lungs: clear bilaterally  Heart: regular rate and rhythm, no murmurs/gallops/rubs  Breasts: symmetric, no masses/skin changes, nipple discharge, or axillary LAD.  BSE reviewed.  Abdomen: Normal bowel sounds, soft, non-tender, non-distended, no masses, neg Garcia's/McBurney's, no rebound/guarding  Genital: Normal external genitalia, no discharge, no lesions, cervix is non-friable, os is closed, no CMT, no adnexal tenderness or fullness.  Uterus is not enlarged, perineum intact.  Thin prep not done.    Rectal: internal exam is deferred.  External exam is normal.  Lymphatics: no supraclavicular/axillary/epitrochlear/inguinal LAD. No edema.  Neuro: A&O x 3, CN II-XII intact, strength 5/5, reflexes " symmetric, sensory intact to light touch.  Psych: Behavior appropriate, engaging.  Thought processes congruent, non-tangential.  Musculoskeletal: Extremities normal, atraumatic, no swelling  Skin: no rashes or lesions.      Recent Results (from the past 240 hour(s))   Thyroid Stimulating Hormone (TSH)   Result Value Ref Range    TSH 1.98 0.30 - 5.00 uIU/mL   HM2(CBC w/o Differential)   Result Value Ref Range    WBC 3.8 (L) 4.0 - 11.0 thou/uL    RBC 4.17 3.80 - 5.40 mill/uL    Hemoglobin 12.9 12.0 - 16.0 g/dL    Hematocrit 38.2 35.0 - 47.0 %    MCV 92 80 - 100 fL    MCH 31.0 27.0 - 34.0 pg    MCHC 33.8 32.0 - 36.0 g/dL    RDW 11.1 11.0 - 14.5 %    Platelets 243 140 - 440 thou/uL    MPV 8.1 7.0 - 10.0 fL   Lipid Cascade   Result Value Ref Range    Cholesterol 236 (H) <=199 mg/dL    Triglycerides 97 <=149 mg/dL    HDL Cholesterol 59 >=50 mg/dL    LDL Calculated 158 (H) <=129 mg/dL    Patient Fasting > 8hrs? Yes        ASSESSMENT/PLAN: 57 y.o. female physical exam and pap smear.          1. Routine general medical examination at a health care facility    - Thyroid Stimulating Hormone (TSH)  - HM2(CBC w/o Differential)  - Lipid Cascade    2. Visit for screening mammogram  I recommend yearly mammogram  - Mammo Screening Bilateral; Future      There are no discontinued medications.    Routine health maintenance discussion:  No smoking, limited alcohol (7 or less servings per week), 5 fruits/veg servings per day, 200 minutes of exercise per week.  Daily calcium/vitamin D guidelines, bone health, yearly mammogram after age 39/regular pap smears/colon cancer screening beginning at age 50.  Accident avoidance, sun screen.      Will contact her with the results of the labs when available.    Dorothy Brock M.D.

## 2021-06-21 NOTE — PROGRESS NOTES
Chloe Pereira is a 58 y.o. female is here for a  Health Maintenance exam. Patient is overall doing well.    Chloe has had a busy year.  She sprained her ankle and fractured her left fifth metatarsal.  She was nonweightbearing for 6 weeks but it is all healed well.    Children are all doing well.  Her youngest son is in [facility] [email or web address].  Another daughter works as a  certified nurse practitioner for surgical team at [employer].  Another daughter just got  and has a 5-month-old baby.  Patient is currently living with her in-laws while building a home in [place].  She continues to travel a lot.  There is some underlying anxiety but it is under control with 10 mg of Prozac daily.  She carries the lorazepam with her but is only taken 1 tablet this year.  It helps just to carry it knowing that it is there because she never wants to go through another panic attack.    She sees dermatology regularly and has had a number of basal cells removed.    The 10-year ASCVD risk score (Crane MAXWELL Jr, et al., 2013) is: 1.3%    Values used to calculate the score:      Age: 58 years      Sex: Female      Is Non- : No      Diabetic: No      Tobacco smoker: No      Systolic Blood Pressure: 98 mmHg      Is BP treated: No      HDL Cholesterol: 60 mg/dL      Total Cholesterol: 200 mg/dL      Healthy Habits:   Regular Exercise: No  Sunscreen Use: Yes  Healthy Diet: Yes  Dental Visits Regularly: Yes  Seat Belt: Yes  Sexually active: Yes  Self Breast Exam Monthly:No  Hemoccults: No  Flex Sig: Yes  Colonoscopy: Yes  Lipid Profile: Yes  Glucose Screen: Yes  Prevention of Osteoporosis: No  Last Dexa: No  Guns at Home:  No  Domestic Violence:  Yes and No    Current Outpatient Medications Include:    Current Outpatient Prescriptions:      FLUoxetine (PROZAC) 10 MG capsule, TAKE 1 CAPSULE BY MOUTH ONCE DAILY, Disp: 90 capsule, Rfl: 3     levothyroxine (SYNTHROID, LEVOTHROID) 50 MCG tablet, TAKE 1 TABLET  (50 MCG TOTAL) BY MOUTH DAILY AT 6:00 AM., Disp: 90 tablet, Rfl: 3     LORazepam (ATIVAN) 0.5 MG tablet, TAKE 1 TABLET (0.5 MG) BY MOUTH EVERY 6 HOURS AS NEEDED FOR ANXIETY., Disp: 10 tablet, Rfl: 0     mesalamine (CANASA) 1000 MG suppository, INSERT 1 SUPPOSITORY BY RECTAL ROUTE EVERY DAY AT BEDTIME, Disp: 30 suppository, Rfl: 3     cholecalciferol, vitamin D3, 4,000 unit cap, Take 1 capsule by mouth daily., Disp: , Rfl: 0    Allergies:    No past medical history on file.    No past surgical history on file.    OB History    Para Term  AB Living   3 3 3   6   SAB TAB Ectopic Multiple Live Births       3      # Outcome Date GA Lbr Anuel/2nd Weight Sex Delivery Anes PTL Lv   3 Term 97    M Vag-Spont  N GABRIELLA   2 Term 90    F Vag-Spont  N GABRIELLA   1 Term 03/10/88    M Vag-Spont  N GABRIELLA          Immunization History   Administered Date(s) Administered     Influenza, inj, historic,unspecified 10/27/2016     Influenza,seasonal quad, PF, 36+MOS 10/11/2014, 10/15/2015, 10/19/2016, 2017     MMR 2015     PPD Test 2010, 2015, 2017     Tdap 2011       Family History   Problem Relation Age of Onset     Skin cancer Mother      Squamous  A&W at 80 on      Hypothyroidism Mother      Skin cancer Father      Squamous     Hypothyroidism Father      Atrial fibrillation Father      Hypothyroidism Sister      No Medical Problems Brother      No Medical Problems Sister        Social History     Social History     Marital status:      Spouse name: Iglesia     Number of children: 3     Years of education: N/A     Occupational History     RN      Travels, 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     Social History Main Topics     Smoking status: Former Smoker     Smokeless tobacco: Never Used     Alcohol use Yes     Drug use: No     Sexual activity: Yes     Other Topics Concern     Not on file     Social History Narrative       Last cholesterol:   Lab Results   Component Value Date    CHOL 200 (H)  "05/23/2018    CHOL 236 (H) 11/22/2017    CHOL 193 05/01/2017     Lab Results   Component Value Date    HDL 60 05/23/2018    HDL 59 11/22/2017    HDL 55 05/01/2017     Lab Results   Component Value Date    LDLCALC 130 (H) 05/23/2018    LDLCALC 158 (H) 11/22/2017    LDLCALC 123 05/01/2017     Lab Results   Component Value Date    TRIG 49 05/23/2018    TRIG 97 11/22/2017    TRIG 76 05/01/2017     No components found for: CHOLHDL    Mammogram 12/21/2017  Colonoscopy 12/14/2012, normal repeat in 10 years  Pap smear 11/6/2016 normal.      Birth Control Method: Not applicable  High Risk/Behavior: None      LMP: No LMP recorded. Patient is postmenopausal.      Review of Systems:   General:  Denies fever, chills, HA, fatigue, myalgias, weight change    Eyes: Denies vision changes   Ears/Nose/Throat: Denies nasal congestion, rhinorrhea, ear pain or discharge, sore throat, swollen glands  Cardiovascular: Denies CP, palpitations  Respiratory:  Denies SOB, cough  Gastrointestinal:  Denies changes in bowel habits, melena, rectal bleeding,  Genitourinary: Denies changes in urine habits/frequency/dysuria, hematuria   Musculoskeletal:  Denies  joint pain or swelling or erythema, edema  Skin: Denies rashes   Neurologic: Denies weakness, paresthesia  Psychiatric: Denies mood changes   Endocrine: Denies polyuria, polydipsia, polyphagia  Heme/Lymphatic: Denies problem with bleeding   Allergic/Immunologic: Denies problem     POSITIVES: 112 point review of systems is negative      PHYSICAL EXAM:    BP 98/70  Pulse 75  Ht 5' 5\" (1.651 m)  Wt 111 lb 9 oz (50.6 kg)  SpO2 99%  BMI 18.56 kg/m2    Wt Readings from Last 3 Encounters:   10/29/18 111 lb 9 oz (50.6 kg)   11/22/17 115 lb (52.2 kg)   06/22/17 117 lb (53.1 kg)       Body mass index is 18.56 kg/(m^2).    Well developed, well nourished, no acute distress.  HEENT: normocephalic/atraumatic, PERRLA/EOMI, TMs: Gray, normal light reflex, no nasal discharge.  Oral mucosa: no " erythema/exudate  Neck: No LAD/masses/thyromegaly/bruits  Lungs: clear bilaterally  Heart: regular rate and rhythm, no murmurs/gallops/rubs  Breasts: symmetric, no masses/skin changes, nipple discharge, or axillary LAD.  BSE reviewed.  Abdomen: Normal bowel sounds, soft, non-tender, non-distended, no masses, neg Garcia's/McBurney's, no rebound/guarding  Genital: Normal external genitalia, no discharge, no lesions,  no CMT, no adnexal tenderness or fullness.  Uterus is not enlarged, perineum intact.  Thin prep not done.   Rectal: internal exam is deferred.  External exam is normal.  Lymphatics: no supraclavicular/axillary/epitrochlear/inguinal LAD. No edema.  Neuro: A&O x 3, CN II-XII intact, strength 5/5, reflexes symmetric, sensory intact to light touch.  Psych: Behavior appropriate, engaging.  Thought processes congruent, non-tangential.  Musculoskeletal: Extremities normal, atraumatic, no swelling  Skin: no rashes or lesions.      Recent Results (from the past 240 hour(s))   Thyroid Stimulating Hormone (TSH)   Result Value Ref Range    TSH 1.60 0.30 - 5.00 uIU/mL   Basic Metabolic Panel   Result Value Ref Range    Sodium 137 136 - 145 mmol/L    Potassium 4.4 3.5 - 5.0 mmol/L    Chloride 102 98 - 107 mmol/L    CO2 23 22 - 31 mmol/L    Anion Gap, Calculation 12 5 - 18 mmol/L    Glucose 101 70 - 125 mg/dL    Calcium 10.0 8.5 - 10.5 mg/dL    BUN 7 (L) 8 - 22 mg/dL    Creatinine 0.70 0.60 - 1.10 mg/dL    GFR MDRD Af Amer >60 >60 mL/min/1.73m2    GFR MDRD Non Af Amer >60 >60 mL/min/1.73m2   Lipid Cascade   Result Value Ref Range    Cholesterol 222 (H) <=199 mg/dL    Triglycerides 63 <=149 mg/dL    HDL Cholesterol 72 >=50 mg/dL    LDL Calculated 137 (H) <=129 mg/dL    Patient Fasting > 8hrs? Yes        ASSESSMENT/PLAN: 58 y.o. female physical exam and pap smear.          1. Routine general medical examination at a health care facility  Normal exam  - Basic Metabolic Panel    2. Hyperlipidemia  Patient eats healthy and  exercises regularly  - Lipid Cascade    3. Hypothyroid  Thyroid replacement has been stable  - Thyroid Stimulating Hormone (TSH)    4. Need for vaccination  Flu vaccine given  - Influenza, Recombinant, Inj, Quadrivalent, PF, 18+YRS    Medications Discontinued During This Encounter   Medication Reason     levothyroxine (SYNTHROID, LEVOTHROID) 50 MCG tablet Duplicate order       Routine health maintenance discussion:  No smoking, limited alcohol (7 or less servings per week), 5 fruits/veg servings per day, 200 minutes of exercise per week.  Daily calcium/vitamin D guidelines, bone health, yearly mammogram after age 39/regular pap smears/colon cancer screening beginning at age 50.  Accident avoidance, sun screen.      Will contact her with the results of the labs when available.    Dorothy Brock MD

## 2021-07-18 ENCOUNTER — HEALTH MAINTENANCE LETTER (OUTPATIENT)
Age: 61
End: 2021-07-18

## 2021-09-12 ENCOUNTER — HEALTH MAINTENANCE LETTER (OUTPATIENT)
Age: 61
End: 2021-09-12

## 2021-09-14 ENCOUNTER — TRANSFERRED RECORDS (OUTPATIENT)
Dept: HEALTH INFORMATION MANAGEMENT | Facility: CLINIC | Age: 61
End: 2021-09-14

## 2022-01-12 ENCOUNTER — TRANSFERRED RECORDS (OUTPATIENT)
Dept: HEALTH INFORMATION MANAGEMENT | Facility: CLINIC | Age: 62
End: 2022-01-12

## 2022-04-23 ENCOUNTER — HEALTH MAINTENANCE LETTER (OUTPATIENT)
Age: 62
End: 2022-04-23

## 2022-08-13 ENCOUNTER — HEALTH MAINTENANCE LETTER (OUTPATIENT)
Age: 62
End: 2022-08-13

## 2022-10-28 ENCOUNTER — TRANSFERRED RECORDS (OUTPATIENT)
Dept: HEALTH INFORMATION MANAGEMENT | Facility: CLINIC | Age: 62
End: 2022-10-28

## 2022-10-30 ENCOUNTER — HEALTH MAINTENANCE LETTER (OUTPATIENT)
Age: 62
End: 2022-10-30

## 2023-09-03 ENCOUNTER — HEALTH MAINTENANCE LETTER (OUTPATIENT)
Age: 63
End: 2023-09-03

## 2023-10-24 ENCOUNTER — TRANSFERRED RECORDS (OUTPATIENT)
Dept: HEALTH INFORMATION MANAGEMENT | Facility: CLINIC | Age: 63
End: 2023-10-24

## 2024-06-20 ENCOUNTER — TRANSFERRED RECORDS (OUTPATIENT)
Dept: HEALTH INFORMATION MANAGEMENT | Facility: CLINIC | Age: 64
End: 2024-06-20